# Patient Record
Sex: MALE | Race: WHITE | Employment: FULL TIME | URBAN - METROPOLITAN AREA
[De-identification: names, ages, dates, MRNs, and addresses within clinical notes are randomized per-mention and may not be internally consistent; named-entity substitution may affect disease eponyms.]

---

## 2019-06-01 ENCOUNTER — APPOINTMENT (OUTPATIENT)
Dept: GENERAL RADIOLOGY | Facility: CLINIC | Age: 24
End: 2019-06-01
Attending: EMERGENCY MEDICINE
Payer: COMMERCIAL

## 2019-06-01 ENCOUNTER — HOSPITAL ENCOUNTER (EMERGENCY)
Facility: CLINIC | Age: 24
Discharge: HOME | End: 2019-06-01
Attending: EMERGENCY MEDICINE | Admitting: EMERGENCY MEDICINE
Payer: COMMERCIAL

## 2019-06-01 VITALS
WEIGHT: 180 LBS | HEART RATE: 81 BPM | OXYGEN SATURATION: 93 % | TEMPERATURE: 98.8 F | BODY MASS INDEX: 23.86 KG/M2 | DIASTOLIC BLOOD PRESSURE: 93 MMHG | RESPIRATION RATE: 16 BRPM | HEIGHT: 73 IN | SYSTOLIC BLOOD PRESSURE: 127 MMHG

## 2019-06-01 DIAGNOSIS — R06.2 WHEEZING: ICD-10-CM

## 2019-06-01 DIAGNOSIS — R07.89 CHEST WALL PAIN: ICD-10-CM

## 2019-06-01 DIAGNOSIS — K21.9 GASTROESOPHAGEAL REFLUX DISEASE, ESOPHAGITIS PRESENCE NOT SPECIFIED: ICD-10-CM

## 2019-06-01 DIAGNOSIS — Z72.0 TOBACCO ABUSE: ICD-10-CM

## 2019-06-01 LAB
ALBUMIN SERPL-MCNC: 4.1 G/DL (ref 3.4–5)
ALP SERPL-CCNC: 72 U/L (ref 40–150)
ALT SERPL W P-5'-P-CCNC: 22 U/L (ref 0–70)
ANION GAP SERPL CALCULATED.3IONS-SCNC: 8 MMOL/L (ref 3–14)
AST SERPL W P-5'-P-CCNC: 20 U/L (ref 0–45)
BASOPHILS # BLD AUTO: 0.1 10E9/L (ref 0–0.2)
BASOPHILS NFR BLD AUTO: 0.8 %
BILIRUB SERPL-MCNC: 1 MG/DL (ref 0.2–1.3)
BUN SERPL-MCNC: 16 MG/DL (ref 7–30)
CALCIUM SERPL-MCNC: 8.7 MG/DL (ref 8.5–10.1)
CHLORIDE SERPL-SCNC: 107 MMOL/L (ref 94–109)
CO2 SERPL-SCNC: 25 MMOL/L (ref 20–32)
CREAT SERPL-MCNC: 0.8 MG/DL (ref 0.66–1.25)
D DIMER PPP FEU-MCNC: 0.3 UG/ML FEU (ref 0–0.5)
DIFFERENTIAL METHOD BLD: ABNORMAL
EOSINOPHIL # BLD AUTO: 0.8 10E9/L (ref 0–0.7)
EOSINOPHIL NFR BLD AUTO: 9.4 %
ERYTHROCYTE [DISTWIDTH] IN BLOOD BY AUTOMATED COUNT: 12.6 % (ref 10–15)
GFR SERPL CREATININE-BSD FRML MDRD: >90 ML/MIN/{1.73_M2}
GLUCOSE SERPL-MCNC: 114 MG/DL (ref 70–99)
HCT VFR BLD AUTO: 48.7 % (ref 40–53)
HGB BLD-MCNC: 15.9 G/DL (ref 13.3–17.7)
IMM GRANULOCYTES # BLD: 0 10E9/L (ref 0–0.4)
IMM GRANULOCYTES NFR BLD: 0.1 %
LIPASE SERPL-CCNC: 88 U/L (ref 73–393)
LYMPHOCYTES # BLD AUTO: 2.7 10E9/L (ref 0.8–5.3)
LYMPHOCYTES NFR BLD AUTO: 30.1 %
MCH RBC QN AUTO: 28.5 PG (ref 26.5–33)
MCHC RBC AUTO-ENTMCNC: 32.6 G/DL (ref 31.5–36.5)
MCV RBC AUTO: 87 FL (ref 78–100)
MONOCYTES # BLD AUTO: 0.6 10E9/L (ref 0–1.3)
MONOCYTES NFR BLD AUTO: 6.9 %
NEUTROPHILS # BLD AUTO: 4.7 10E9/L (ref 1.6–8.3)
NEUTROPHILS NFR BLD AUTO: 52.7 %
NRBC # BLD AUTO: 0 10*3/UL
NRBC BLD AUTO-RTO: 0 /100
PLATELET # BLD AUTO: 205 10E9/L (ref 150–450)
POTASSIUM SERPL-SCNC: 4 MMOL/L (ref 3.4–5.3)
PROT SERPL-MCNC: 7.5 G/DL (ref 6.8–8.8)
RBC # BLD AUTO: 5.58 10E12/L (ref 4.4–5.9)
SODIUM SERPL-SCNC: 140 MMOL/L (ref 133–144)
TROPONIN I SERPL-MCNC: <0.015 UG/L (ref 0–0.04)
WBC # BLD AUTO: 8.9 10E9/L (ref 4–11)

## 2019-06-01 PROCEDURE — 93010 ELECTROCARDIOGRAM REPORT: CPT | Mod: Z6 | Performed by: EMERGENCY MEDICINE

## 2019-06-01 PROCEDURE — 71046 X-RAY EXAM CHEST 2 VIEWS: CPT

## 2019-06-01 PROCEDURE — 93005 ELECTROCARDIOGRAM TRACING: CPT

## 2019-06-01 PROCEDURE — 25000132 ZZH RX MED GY IP 250 OP 250 PS 637: Performed by: EMERGENCY MEDICINE

## 2019-06-01 PROCEDURE — 85025 COMPLETE CBC W/AUTO DIFF WBC: CPT | Performed by: EMERGENCY MEDICINE

## 2019-06-01 PROCEDURE — 99284 EMERGENCY DEPT VISIT MOD MDM: CPT | Mod: 25

## 2019-06-01 PROCEDURE — 99284 EMERGENCY DEPT VISIT MOD MDM: CPT | Mod: 25 | Performed by: EMERGENCY MEDICINE

## 2019-06-01 PROCEDURE — 94640 AIRWAY INHALATION TREATMENT: CPT

## 2019-06-01 PROCEDURE — 85379 FIBRIN DEGRADATION QUANT: CPT | Performed by: EMERGENCY MEDICINE

## 2019-06-01 PROCEDURE — 25000125 ZZHC RX 250: Performed by: EMERGENCY MEDICINE

## 2019-06-01 PROCEDURE — 84484 ASSAY OF TROPONIN QUANT: CPT | Performed by: EMERGENCY MEDICINE

## 2019-06-01 PROCEDURE — 80053 COMPREHEN METABOLIC PANEL: CPT | Performed by: EMERGENCY MEDICINE

## 2019-06-01 PROCEDURE — 83690 ASSAY OF LIPASE: CPT | Performed by: EMERGENCY MEDICINE

## 2019-06-01 RX ORDER — IPRATROPIUM BROMIDE AND ALBUTEROL SULFATE 2.5; .5 MG/3ML; MG/3ML
3 SOLUTION RESPIRATORY (INHALATION) ONCE
Status: COMPLETED | OUTPATIENT
Start: 2019-06-01 | End: 2019-06-01

## 2019-06-01 RX ORDER — ALBUTEROL SULFATE 90 UG/1
2 AEROSOL, METERED RESPIRATORY (INHALATION) EVERY 4 HOURS PRN
Qty: 6.7 G | Refills: 0 | Status: SHIPPED | OUTPATIENT
Start: 2019-06-01 | End: 2020-01-13

## 2019-06-01 RX ADMIN — IPRATROPIUM BROMIDE AND ALBUTEROL SULFATE 3 ML: .5; 3 SOLUTION RESPIRATORY (INHALATION) at 19:36

## 2019-06-01 RX ADMIN — LIDOCAINE HYDROCHLORIDE 30 ML: 20 SOLUTION ORAL; TOPICAL at 19:30

## 2019-06-01 ASSESSMENT — ENCOUNTER SYMPTOMS
RHINORRHEA: 0
SORE THROAT: 1
FEVER: 0
ABDOMINAL PAIN: 0
CHILLS: 0
COUGH: 1
SINUS PAIN: 0
SHORTNESS OF BREATH: 1

## 2019-06-01 ASSESSMENT — MIFFLIN-ST. JEOR: SCORE: 1875.35

## 2019-06-01 NOTE — ED AVS SNAPSHOT
[facility]  [address] [facility] [address]  Phone:  [phone]  Fax:  [phone]                                    Paulo Swenson   MRN: [id number]    Department:  [facility]   Date of Visit:  6/1/2019           After Visit Summary Signature Page    I have received my discharge instructions, and my questions have been answered. I have discussed any challenges I see with this plan with the nurse or doctor.    ..........................................................................................................................................  Patient/Patient Representative Signature      ..........................................................................................................................................  Patient Representative Print Name and Relationship to Patient    ..................................................               ................................................  Date                                   Time    ..........................................................................................................................................  Reviewed by Signature/Title    ...................................................              ..............................................  Date                                               Time          22EPIC Rev 08/18

## 2019-06-02 NOTE — ED PROVIDER NOTES
History     Chief Complaint   Patient presents with     Chest Pain     Shortness of Breath     HPI  Paulo Swenson is a 23 year old male who presents to the ED for evaluation of chest pain with shortness of breath. The patient states that he has been experiencing pain in the center of his chest for about the past week. He denies any recent injury to the area, and reports that the pain seems to improve with deep breathing. He also acknowledges mild shortness of breath with exertion. The patient acknowledges a non-productive cough and a sore throat but denies any fever, chills, rhinorrhea, sinus pain, shakes, or swelling in his legs. He states that he has been taking ibuprofen PM for pain relief and as a sleep aid. The patient denies seasonal allergies, but acknowledges a past history of GERD. He adds that he has not taken medication for GERD in over one year. He has a negative family history for blood clots in the legs or lungs. The patient's mother adds that the patient smokes cigarettes, however he has not recently given his symptoms.      Allergies:  No Known Allergies    Problem List:    There are no active problems to display for this patient.       Past Medical History:    No past medical history on file.    Past Surgical History:    No past surgical history on file.    Family History:    No family history on file.    Social History:  Marital Status:  Single [1]  Social History     Tobacco Use     Smoking status: Not on file   Substance Use Topics     Alcohol use: Not on file     Drug use: Not on file        Medications:      albuterol (PROAIR HFA/PROVENTIL HFA/VENTOLIN HFA) 108 (90 Base) MCG/ACT inhaler   omeprazole (PRILOSEC) 20 MG DR capsule         Review of Systems   Constitutional: Negative for chills and fever.   HENT: Positive for sore throat. Negative for rhinorrhea and sinus pain.    Respiratory: Positive for cough and shortness of breath.    Cardiovascular: Positive for chest pain. Negative for  "leg swelling.   Gastrointestinal: Negative for abdominal pain.   All other systems reviewed and are negative.      Physical Exam   BP: (!) 156/96  Pulse: 105  Temp: 98.8  F (37.1  C)  Resp: 16  Height: 185.4 cm (6' 1\")  Weight: 81.6 kg (180 lb)  SpO2: 99 %      Physical Exam General alert cooperative male in mild to moderate distress.  HEENT shows ears to be clear bilaterally.  Eyes show no injection.  Nasal packs are boggy with clear discharge.  Orally he has no tonsillar hypertrophy or exudate.  Speech is clear concise.  Neck is supple without limitation.  Lungs reveal expiratory wheezes all lung fields.  Cardiac auscultation is normal.  Chest wall he has reducible tenderness of the mid left sternal area but feels this is different than the symptoms he is experienced.  Abdomen reveals active bowel sounds on palpation no localizing tenderness, masses, or organomegaly.  No CVA tenderness.  Legs reveal no edema, calf or thigh tenderness, and Homans is negative.    ED Course        Procedures               EKG Interpretation:      Interpreted by Nadeem Mcghee  Time reviewed: 19:30  Symptoms at time of EKG: midsternal cp   Rhythm: normal sinus   Rate: Normal  Axis: Normal  Ectopy: none  Conduction: RSR V1  ST Segments/ T Waves: No acute ischemic changes  Q Waves: none  Comparison to prior: No old EKG available    Clinical Impression: normal EKG                Critical Care time:  none               Results for orders placed or performed during the hospital encounter of 06/01/19 (from the past 24 hour(s))   CBC with platelets differential   Result Value Ref Range    WBC 8.9 4.0 - 11.0 10e9/L    RBC Count 5.58 4.4 - 5.9 10e12/L    Hemoglobin 15.9 13.3 - 17.7 g/dL    Hematocrit 48.7 40.0 - 53.0 %    MCV 87 78 - 100 fl    MCH 28.5 26.5 - 33.0 pg    MCHC 32.6 31.5 - 36.5 g/dL    RDW 12.6 10.0 - 15.0 %    Platelet Count 205 150 - 450 10e9/L    Diff Method Automated Method     % Neutrophils 52.7 %    % Lymphocytes 30.1 %    % " Monocytes 6.9 %    % Eosinophils 9.4 %    % Basophils 0.8 %    % Immature Granulocytes 0.1 %    Nucleated RBCs 0 0 /100    Absolute Neutrophil 4.7 1.6 - 8.3 10e9/L    Absolute Lymphocytes 2.7 0.8 - 5.3 10e9/L    Absolute Monocytes 0.6 0.0 - 1.3 10e9/L    Absolute Eosinophils 0.8 (H) 0.0 - 0.7 10e9/L    Absolute Basophils 0.1 0.0 - 0.2 10e9/L    Abs Immature Granulocytes 0.0 0 - 0.4 10e9/L    Absolute Nucleated RBC 0.0    D dimer quantitative   Result Value Ref Range    D Dimer 0.3 0.0 - 0.50 ug/ml FEU   Comprehensive metabolic panel   Result Value Ref Range    Sodium 140 133 - 144 mmol/L    Potassium 4.0 3.4 - 5.3 mmol/L    Chloride 107 94 - 109 mmol/L    Carbon Dioxide 25 20 - 32 mmol/L    Anion Gap 8 3 - 14 mmol/L    Glucose 114 (H) 70 - 99 mg/dL    Urea Nitrogen 16 7 - 30 mg/dL    Creatinine 0.80 0.66 - 1.25 mg/dL    GFR Estimate >90 >60 mL/min/[1.73_m2]    GFR Estimate If Black >90 >60 mL/min/[1.73_m2]    Calcium 8.7 8.5 - 10.1 mg/dL    Bilirubin Total 1.0 0.2 - 1.3 mg/dL    Albumin 4.1 3.4 - 5.0 g/dL    Protein Total 7.5 6.8 - 8.8 g/dL    Alkaline Phosphatase 72 40 - 150 U/L    ALT 22 0 - 70 U/L    AST 20 0 - 45 U/L   Lipase   Result Value Ref Range    Lipase 88 73 - 393 U/L   Troponin I   Result Value Ref Range    Troponin I ES <0.015 0.000 - 0.045 ug/L   XR Chest 2 Views    Narrative    CHEST TWO VIEWS 6/1/2019 8:36 PM     HISTORY: Midsternal chest pain with cough.    COMPARISON: None.     FINDINGS: There are no acute infiltrates. The cardiac silhouette is  not enlarged. Pulmonary vasculature is unremarkable.      Impression    IMPRESSION: No acute disease.       Medications   lidocaine HCl (XYLOCAINE) 2 % 15 mL, alum & mag hydroxide-simethicone (MYLANTA ES/MAALOX  ES) 15 mL GI Cocktail (30 mLs Oral Given 6/1/19 1930)   ipratropium - albuterol 0.5 mg/2.5 mg/3 mL (DUONEB) neb solution 3 mL (3 mLs Nebulization Given 6/1/19 1936)         19:18 Patient assessed.    EKG obtained and reviewed as above.  Blood work  is ordered.  Patient is given a GI cocktail.  Also given a DuoNeb for wheezing.  On recheck patient states that the neb did help but now he feels like he is more wheezy.  This is confirmed on x-ray.  He is quite musical.  Additional nebulizers ordered.  He states the GI cocktail resolved his pain immediately.  Assessments & Plan (with Medical Decision Making)   Paulo Swenson is a 23 year old male who presents to the ED for evaluation of chest pain with shortness of breath. The patient states that he has been experiencing pain in the center of his chest for about the past week. He denies any recent injury to the area, and reports that the pain seems to improve with deep breathing. He also acknowledges mild shortness of breath with exertion. The patient acknowledges a non-productive cough and a sore throat but denies any fever, chills, rhinorrhea, sinus pain, shakes, or swelling in his legs. He states that he has been taking ibuprofen PM for pain relief and as a sleep aid. The patient denies seasonal allergies, but acknowledges a past history of GERD. He adds that he has not taken medication for GERD in over one year. He has a negative family history for blood clots in the legs or lungs. The patient's mother adds that the patient smokes cigarettes, however he has not recently given his symptoms.  On presentation patient afebrile and vitally stable.  Not hypoxic.  He had expiratory wheeze all lung fields.  Improved with nebulization.  He had some reducible chest wall pain.  X-ray did not show any acute abnormality in the lungs with normal cardiac size and no evidence of failure, pneumothorax or rib abnormality.  Blood work was unremarkable including d-dimer troponin.  EKG did not show acute ischemic changes.  Old EKG for comparison.  Blood work showed no evidence of pancreatitis, hepatitis, or biliary disease.  GI cocktail resolved patient she has symptoms.  Suspect he has chest wall pain which he can take ibuprofen  for.  He should take this with food so not to irritate his stomach more.  Ice or heat if beneficial.  #2 suspect reflux.  He is recommended to avoid excessive caffeine, tobacco, NSAIDs, and alcohol but he states he does not drink anymore.  #3 wheezing suspect related to his smoking but cannot rule out asthma.  Albuterol as needed.  Smoking cessation handout is provided.  I have reviewed the nursing notes.    I have reviewed the findings, diagnosis, plan and need for follow up with the patient.          Medication List      Started    albuterol 108 (90 Base) MCG/ACT inhaler  Commonly known as:  PROAIR HFA/PROVENTIL HFA/VENTOLIN HFA  2 puffs, Inhalation, EVERY 4 HOURS PRN     omeprazole 20 MG DR capsule  Commonly known as:  priLOSEC  20 mg, Oral, DAILY            Final diagnoses:   Chest wall pain   Wheezing   Gastroesophageal reflux disease, esophagitis presence not specified   Tobacco abuse     This document serves as a record of the services and decisions personally performed and made by Nadeem cMghee MD. It was created on HIS/HER behalf by Clarisa Gallegos, a trained medical scribe. The creation of this document is based the provider's statements to the medical scribe.  Clarisa Gallegos 7:20 PM 6/1/2019    Provider:   The information in this document, created by the medical scribe for me, accurately reflects the services I personally performed and the decisions made by me. I have reviewed and approved this document for accuracy prior to leaving the patient care area.  Nadeem Mcghee MD 7:20 PM 6/1/2019 6/1/2019   [facility] EMERGENCY DEPARTMENT     Nadeem Mcghee MD  06/01/19 9737

## 2019-06-02 NOTE — ED TRIAGE NOTES
Pt has mid sternal chest pain and SOA that started 10 days ago, No injury. Pt says pain is less when he takes a deep breath.

## 2020-01-13 ENCOUNTER — HOSPITAL ENCOUNTER (EMERGENCY)
Facility: CLINIC | Age: 25
Discharge: HOME | End: 2020-01-13
Attending: NURSE PRACTITIONER | Admitting: NURSE PRACTITIONER

## 2020-01-13 VITALS
BODY MASS INDEX: 23.86 KG/M2 | OXYGEN SATURATION: 98 % | SYSTOLIC BLOOD PRESSURE: 155 MMHG | DIASTOLIC BLOOD PRESSURE: 98 MMHG | HEIGHT: 73 IN | WEIGHT: 180 LBS | RESPIRATION RATE: 16 BRPM | TEMPERATURE: 99.2 F

## 2020-01-13 DIAGNOSIS — S61.211A LACERATION OF LEFT INDEX FINGER WITHOUT FOREIGN BODY WITHOUT DAMAGE TO NAIL, INITIAL ENCOUNTER: ICD-10-CM

## 2020-01-13 PROCEDURE — 12002 RPR S/N/AX/GEN/TRNK2.6-7.5CM: CPT | Mod: Z6 | Performed by: NURSE PRACTITIONER

## 2020-01-13 PROCEDURE — 99283 EMERGENCY DEPT VISIT LOW MDM: CPT | Mod: 25 | Performed by: NURSE PRACTITIONER

## 2020-01-13 PROCEDURE — 90715 TDAP VACCINE 7 YRS/> IM: CPT | Performed by: NURSE PRACTITIONER

## 2020-01-13 PROCEDURE — 99282 EMERGENCY DEPT VISIT SF MDM: CPT | Mod: 25 | Performed by: NURSE PRACTITIONER

## 2020-01-13 PROCEDURE — 12002 RPR S/N/AX/GEN/TRNK2.6-7.5CM: CPT | Performed by: NURSE PRACTITIONER

## 2020-01-13 PROCEDURE — 90471 IMMUNIZATION ADMIN: CPT | Performed by: NURSE PRACTITIONER

## 2020-01-13 PROCEDURE — 25000128 H RX IP 250 OP 636: Performed by: NURSE PRACTITIONER

## 2020-01-13 RX ADMIN — CLOSTRIDIUM TETANI TOXOID ANTIGEN (FORMALDEHYDE INACTIVATED), CORYNEBACTERIUM DIPHTHERIAE TOXOID ANTIGEN (FORMALDEHYDE INACTIVATED), BORDETELLA PERTUSSIS TOXOID ANTIGEN (GLUTARALDEHYDE INACTIVATED), BORDETELLA PERTUSSIS FILAMENTOUS HEMAGGLUTININ ANTIGEN (FORMALDEHYDE INACTIVATED), BORDETELLA PERTUSSIS PERTACTIN ANTIGEN, AND BORDETELLA PERTUSSIS FIMBRIAE 2/3 ANTIGEN 0.5 ML: 5; 2; 2.5; 5; 3; 5 INJECTION, SUSPENSION INTRAMUSCULAR at 11:57

## 2020-01-13 ASSESSMENT — MIFFLIN-ST. JEOR: SCORE: 1870.35

## 2020-01-13 NOTE — ED NOTES
Laceration to pt left index finger, pt reports he cut his finger on a piece of steel wire. Pt holding pressure and gauze, laceration still bleeding but has slowed down per pt report

## 2020-01-13 NOTE — ED PROVIDER NOTES
"  History     Chief Complaint   Patient presents with     Laceration     left index finger cut on steel banding at work-pta     HPI  Paulo Swenson is a 24 year old male who presents to the emergency department for evaluation of left index finger laceration.  This occurred today at work.  Patient cut his finger on a steel banding/wire.  Bleeding is controlled.  Tetanus is not up-to-date.    Allergies:  No Known Allergies    Problem List:    There are no active problems to display for this patient.       Past Medical History:    No past medical history on file.    Past Surgical History:    No past surgical history on file.    Family History:    No family history on file.    Social History:  Marital Status:  Single [1]  Social History     Tobacco Use     Smoking status: Not on file   Substance Use Topics     Alcohol use: Not on file     Drug use: Not on file        Medications:    albuterol (PROAIR HFA/PROVENTIL HFA/VENTOLIN HFA) 108 (90 Base) MCG/ACT inhaler          Review of Systems  Neuro: no numbness in the left index finger.  Physical Exam   BP: (!) 155/98  Heart Rate: 101  Temp: 99.2  F (37.3  C)  Resp: 16  Height: 185.4 cm (6' 1\")  Weight: 81.6 kg (180 lb)  SpO2: 98 %      Physical Exam  Appearance: Alert, oriented, no acute distress.  Left index finger: 3 cm laceration.  Description: clean wound edges, no foreign bodies. Neurovascular and tendon structures are intact.  Normal strength with flexion and extension against resistance.      ED Course        Procedures     [facility] Procedure Note        Laceration Repair:    Performed by: NOE Salas CNP  Authorized by: NOE Salas CNP  Consent given by: Patient who states understanding of the procedure being performed after discussing the risks, benefits and alternatives.    Preparation: Patient was prepped and draped in usual sterile fashion.  Irrigation solution: saline    Body area:left distal index finger  Laceration " length: 3cm  Contamination: The wound is not contaminated.  Foreign bodies:none  Tendon involvement: none  Anesthesia: Local  Local anesthetic: Bupivacaine 0.5%  Anesthetic total: 4ml    Debridement: none  Skin closure: Closed with 4 sutures x 4.0 Ethilon  Technique: interrupted  Approximation: close  Approximation difficulty: simple    Patient tolerance: Patient tolerated the procedure well with no immediate complications.        No results found for this or any previous visit (from the past 24 hour(s)).    Medications   Tdap (tetanus-diphtheria-acell pertussis) (ADACEL) injection 0.5 mL (0.5 mLs Intramuscular Given 1/13/20 1157)       Assessments & Plan (with Medical Decision Making)   Left index finger laceration as noted above.  Repair as noted above.  Plan as follows:  Tetanus updated today.  Tube gauze dressing applied today.   Do not use left hand today.   Change dressing tomorrow and apply smaller bandage. Dressing should then be changed minimally twice a day and as needed until sutures removed.  Ok to shower but then dry thoroughly and apply band-aid.   Wear aluminum foam finger splint during the day until suture removed.   Sutures out in clinic in 10 days.   Return to the emergency department for increased redness, swelling or pain (signs of infection).    I have reviewed the nursing notes.    I have reviewed the findings, diagnosis, plan and need for follow up with the patient.      Discharge Medication List as of 1/13/2020 12:13 PM          Final diagnoses:   Laceration of left index finger without foreign body without damage to nail, initial encounter       1/13/2020   [facility] EMERGENCY DEPARTMENT     Luba Brunner APRN CNP  01/13/20 1323       Luba Brunner APRN CNP  01/16/20 1934

## 2020-01-13 NOTE — DISCHARGE INSTRUCTIONS
Tetanus updated today.  Tube gauze dressing applied today.   Do not use left hand today.   Change dressing tomorrow and apply smaller bandage. Dressing should then be changed minimally twice a day and as needed until sutures removed.  Ok to shower but then dry thoroughly and apply band-aid.   Wear aluminum foam finger splint during the day until suture removed.   Sutures out in clinic in 10 days.   Return to the emergency department for increased redness, swelling or pain (signs of infection).

## 2020-01-13 NOTE — ED AVS SNAPSHOT
[facility]  [address] [facility] [address]  Phone:  [phone]  Fax:  [phone]                                    Paulo Swenson   MRN: [id number]    Department:  [facility]   Date of Visit:  1/13/2020           After Visit Summary Signature Page    I have received my discharge instructions, and my questions have been answered. I have discussed any challenges I see with this plan with the nurse or doctor.    ..........................................................................................................................................  Patient/Patient Representative Signature      ..........................................................................................................................................  Patient Representative Print Name and Relationship to Patient    ..................................................               ................................................  Date                                   Time    ..........................................................................................................................................  Reviewed by Signature/Title    ...................................................              ..............................................  Date                                               Time          22EPIC Rev 08/18

## 2020-02-16 ENCOUNTER — HEALTH MAINTENANCE LETTER (OUTPATIENT)
Age: 25
End: 2020-02-16

## 2020-11-22 ENCOUNTER — HEALTH MAINTENANCE LETTER (OUTPATIENT)
Age: 25
End: 2020-11-22

## 2021-04-04 ENCOUNTER — HEALTH MAINTENANCE LETTER (OUTPATIENT)
Age: 26
End: 2021-04-04

## 2021-09-18 ENCOUNTER — HEALTH MAINTENANCE LETTER (OUTPATIENT)
Age: 26
End: 2021-09-18

## 2022-04-30 ENCOUNTER — HEALTH MAINTENANCE LETTER (OUTPATIENT)
Age: 27
End: 2022-04-30

## 2022-11-20 ENCOUNTER — HEALTH MAINTENANCE LETTER (OUTPATIENT)
Age: 27
End: 2022-11-20

## 2023-06-02 ENCOUNTER — HEALTH MAINTENANCE LETTER (OUTPATIENT)
Age: 28
End: 2023-06-02

## 2025-03-29 ENCOUNTER — HEALTH MAINTENANCE LETTER (OUTPATIENT)
Age: 30
End: 2025-03-29